# Patient Record
Sex: FEMALE | Race: WHITE | NOT HISPANIC OR LATINO | URBAN - METROPOLITAN AREA
[De-identification: names, ages, dates, MRNs, and addresses within clinical notes are randomized per-mention and may not be internally consistent; named-entity substitution may affect disease eponyms.]

---
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
## 2023-07-28 ENCOUNTER — APPOINTMENT (OUTPATIENT)
Dept: URBAN - METROPOLITAN AREA CLINIC 203 | Age: 23
Setting detail: DERMATOLOGY
End: 2023-07-28

## 2023-07-28 DIAGNOSIS — D22 MELANOCYTIC NEVI: ICD-10-CM

## 2023-07-28 PROBLEM — D22.5 MELANOCYTIC NEVI OF TRUNK: Status: ACTIVE | Noted: 2023-07-28

## 2023-07-28 PROCEDURE — 99212 OFFICE O/P EST SF 10 MIN: CPT

## 2023-07-28 PROCEDURE — OTHER PHOTO-DOCUMENTATION: OTHER

## 2023-07-28 PROCEDURE — OTHER REASSURANCE: OTHER

## 2023-07-28 PROCEDURE — OTHER COUNSELING: OTHER

## 2023-07-28 ASSESSMENT — LOCATION DETAILED DESCRIPTION DERM: LOCATION DETAILED: RIGHT MEDIAL BREAST 12-1:00 REGION

## 2023-07-28 ASSESSMENT — LOCATION ZONE DERM: LOCATION ZONE: TRUNK

## 2023-07-28 ASSESSMENT — LOCATION SIMPLE DESCRIPTION DERM: LOCATION SIMPLE: RIGHT BREAST

## 2024-07-25 ENCOUNTER — HOSPITAL ENCOUNTER (OUTPATIENT)
Facility: CLINIC | Age: 24
Discharge: HOME | End: 2024-07-25
Attending: FAMILY MEDICINE
Payer: COMMERCIAL

## 2024-07-25 VITALS
RESPIRATION RATE: 14 BRPM | SYSTOLIC BLOOD PRESSURE: 110 MMHG | BODY MASS INDEX: 21.48 KG/M2 | WEIGHT: 145 LBS | DIASTOLIC BLOOD PRESSURE: 70 MMHG | TEMPERATURE: 97.9 F | HEART RATE: 75 BPM | OXYGEN SATURATION: 98 % | HEIGHT: 69 IN

## 2024-07-25 DIAGNOSIS — S90.829A: Primary | ICD-10-CM

## 2024-07-25 PROCEDURE — 99203 OFFICE O/P NEW LOW 30 MIN: CPT | Performed by: NURSE PRACTITIONER

## 2024-07-25 PROCEDURE — S9083 URGENT CARE CENTER GLOBAL: HCPCS | Performed by: NURSE PRACTITIONER

## 2024-07-25 RX ORDER — MUPIROCIN 20 MG/G
1 OINTMENT TOPICAL 2 TIMES DAILY
Qty: 10 G | Refills: 0 | Status: SHIPPED | OUTPATIENT
Start: 2024-07-25 | End: 2024-07-30

## 2024-07-25 ASSESSMENT — ENCOUNTER SYMPTOMS
WOUND: 1
CHILLS: 0
COLOR CHANGE: 1
FEVER: 0

## 2024-07-25 NOTE — ED ATTESTATION NOTE
Patient was seen by the NP.  I was available for consultation.  Note reviewed.     Kay Beyer DO  07/25/24 1002

## 2024-07-25 NOTE — DISCHARGE INSTRUCTIONS
Your exam today was consistent with friction blisters and blood blisters with no signs of infection.  Please do not break the blisters open.  Warm soaks 2 times a day in soapy water or epsom salt bath  If they break open apply topical antibiotic as directed  Monitor for any signs of infection

## 2024-07-25 NOTE — ED PROVIDER NOTES
Emergency Medicine Note  HPI   HISTORY OF PRESENT ILLNESS     Patient is a 24-year-old female presents with her mom today for concerns for blisters and possible infection of her toes.  She just got back from a trip to [facility].  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  She says that she started running while she was there and used old sneakers from high school which were too tight.  She then started to get blisters on her toes and one of her toenails looks like it might start to pull off.  She says she would wear flip-flops during the day.  She went to make sure none of them look infected.  She is up-to-date with all her travel vaccines that she had to get them before she left the country.            Patient History   PAST HISTORY     Reviewed from Nursing Triage:       History reviewed. No pertinent past medical history.    No past surgical history on file.    History reviewed. No pertinent family history.           Review of Systems   REVIEW OF SYSTEMS     Review of Systems   Constitutional:  Negative for chills and fever.   Skin:  Positive for color change and wound.   All other systems reviewed and are negative.        VITALS     ED Vitals      Date/Time Temp Pulse Resp BP SpO2 [facility]   07/25/24 0922 36.6 °C (97.9 °F) 75 14 110/70 98 % MG                         Physical Exam   PHYSICAL EXAM     Physical Exam  Vitals and nursing note reviewed.   HENT:      Head: Normocephalic.   Cardiovascular:      Rate and Rhythm: Normal rate.   Pulmonary:      Effort: Pulmonary effort is normal.   Skin:     Findings: No erythema.      Comments: Patient has blisters on 3 toes on her left foot and 1 on her right.  The blister on her second toe on the left foot also has a small amount of dried blood underneath as well.  Her fourth toe on the left foot has dried blood under the nail and the nail looks like it has been pulled up but not loose.  The nail is long and needs clipped.  There is no erythema no swelling and she denies any  pain.   Neurological:      Mental Status: She is alert.           PROCEDURES     Procedures     DATA     Results       None                No orders to display       Scoring tools                                  ED Course & MDM   MDM / ED COURSE / CLINICAL IMPRESSION / DISPO     Medical Decision Making  Patient exam today was consistent with blisters some blood blisters and regular blisters on her toes.  Advise she let them pop on their own and they may callus on their own.  Advise she clip her toenails especially the one on her left toe where the nail seems to been pulled up a little bit.  Advised to monitor for any signs of infection.  I did prescribe some mupirocin to use on any open blistered skin.  Advised to keep her skin open to air like she is going to be in an area that would possibly get her toes dirty so she could wear loosefitting shoes then.             Clinical Impression      Friction blister of the foot, unspecified laterality, initial encounter     _________________       ED Disposition   Discharge                       Merary Ward CRNP  07/25/24 1000